# Patient Record
Sex: MALE | Race: WHITE
[De-identification: names, ages, dates, MRNs, and addresses within clinical notes are randomized per-mention and may not be internally consistent; named-entity substitution may affect disease eponyms.]

---

## 2019-09-05 ENCOUNTER — LABORATORY RESULT (OUTPATIENT)
Age: 24
End: 2019-09-05

## 2019-09-05 ENCOUNTER — APPOINTMENT (OUTPATIENT)
Dept: GASTROENTEROLOGY | Facility: CLINIC | Age: 24
End: 2019-09-05
Payer: COMMERCIAL

## 2019-09-05 VITALS
SYSTOLIC BLOOD PRESSURE: 130 MMHG | HEART RATE: 58 BPM | WEIGHT: 136 LBS | BODY MASS INDEX: 22.66 KG/M2 | DIASTOLIC BLOOD PRESSURE: 67 MMHG | HEIGHT: 65 IN

## 2019-09-05 DIAGNOSIS — R19.4 CHANGE IN BOWEL HABIT: ICD-10-CM

## 2019-09-05 DIAGNOSIS — K21.9 GASTRO-ESOPHAGEAL REFLUX DISEASE W/OUT ESOPHAGITIS: ICD-10-CM

## 2019-09-05 DIAGNOSIS — R14.0 ABDOMINAL DISTENSION (GASEOUS): ICD-10-CM

## 2019-09-05 DIAGNOSIS — R06.00 DYSPNEA, UNSPECIFIED: ICD-10-CM

## 2019-09-05 PROCEDURE — 99204 OFFICE O/P NEW MOD 45 MIN: CPT | Mod: 25

## 2019-09-05 PROCEDURE — 36415 COLL VENOUS BLD VENIPUNCTURE: CPT

## 2019-09-05 PROCEDURE — 82272 OCCULT BLD FECES 1-3 TESTS: CPT

## 2019-09-05 NOTE — CONSULT LETTER
[Dear  ___] : Dear  [unfilled], [Consult Letter:] : I had the pleasure of evaluating your patient, [unfilled]. [Consult Closing:] : Thank you very much for allowing me to participate in the care of this patient.  If you have any questions, please do not hesitate to contact me. [Please see my note below.] : Please see my note below. [Sincerely,] : Sincerely, [FreeTextEntry3] : Jason Quiñonez M.D.\par

## 2019-09-05 NOTE — HISTORY OF PRESENT ILLNESS
[FreeTextEntry1] : Over the past couple of years, Jason has noted change in bowels with tendency towards loose stools, bloating, gassiness, and excessive flatulence. He has been averaging 1 loose stool daily, although he has had some normal stools interspersed. He reports nausea, borborygmi, postprandial fullness, and frequent heartburn (3x/week). The bloating and abdominal pain are present on awakening. Epigastric discomfort generally lasts one hour before subsiding. He saw blood on wiping on one occasion two months ago. Symptoms have not awakened him from a sound sleep. Symptoms are generally better after passing gas or stool. He also notes pruritus ani. He is concerned about the possibility of parasites, as he has been eating mainly raw foods (sushi, meat), usually one large meal daily late in the day. He also reports recent onset dyspnea; he wonders if this might be related to mold exposure in his house. He has not yet tried any medications for his various GI symptoms

## 2019-09-05 NOTE — REASON FOR VISIT
[Consultation] : a consultation visit [FreeTextEntry1] : Pt would like to check for parasites.  He has been eating raw food x 2 years.  Pt c/o abdominal cramping, loose stools, gas, and rectal itching x 1 year.

## 2019-09-05 NOTE — ASSESSMENT
[FreeTextEntry1] : 1. Intermittent abdominal pain, altered bowels with frequent loose stools, gassiness, nausea, GERD, postprandial fullness, and pruritus ani--most likely diagnosis is IBS. Possible celiac disease, IBD, underlying infection (patient concerned about parasites), H. pylori, etc.\par 2. Recent mild dyspnea of unclear cause.\par 3. Status post wisdom teeth extraction.\par \par Plan:\par 1. Extensive bloodwork drawn by me this AM.\par 2. Submit stool to lab for pathogens.\par 3. Probiotic daily.\par 4. Try PPI-OTC daily on awakening x 2 weeks. "PPI Information Sheet" given and discussed.\par 5. He will call for test results next week. \par 6. Return here next month--might consider endoscopic evaluation, depending on test results and clinical course.

## 2019-09-05 NOTE — PHYSICAL EXAM
[General Appearance - Alert] : alert [General Appearance - In No Acute Distress] : in no acute distress [General Appearance - Well Developed] : well developed [General Appearance - Well Nourished] : well nourished [Outer Ear] : the ears and nose were normal in appearance [Sclera] : the sclera and conjunctiva were normal [Oropharynx] : the oropharynx was normal [Neck Appearance] : the appearance of the neck was normal [Neck Cervical Mass (___cm)] : no neck mass was observed [Jugular Venous Distention Increased] : there was no jugular-venous distention [Thyroid Diffuse Enlargement] : the thyroid was not enlarged [Thyroid Nodule] : there were no palpable thyroid nodules [Auscultation Breath Sounds / Voice Sounds] : lungs were clear to auscultation bilaterally [Heart Rate And Rhythm] : heart rate was normal and rhythm regular [Heart Sounds] : normal S1 and S2 [Heart Sounds Gallop] : no gallops [Murmurs] : no murmurs [Edema] : there was no peripheral edema [Full Pulse] : the pedal pulses are present [Heart Sounds Pericardial Friction Rub] : no pericardial rub [Bowel Sounds] : normal bowel sounds [Abdomen Tenderness] : non-tender [Abdomen Soft] : soft [Abdomen Mass (___ Cm)] : no abdominal mass palpated [Abdomen Hernia] : no hernia was discovered [No Rectal Mass] : no rectal mass [Normal Sphincter Tone] : normal sphincter tone [Prostate Enlargement] : the prostate was not enlarged [Cervical Lymph Nodes Enlarged Posterior Bilaterally] : posterior cervical [Prostate Tenderness] : the prostate was not tender [Cervical Lymph Nodes Enlarged Anterior Bilaterally] : anterior cervical [Supraclavicular Lymph Nodes Enlarged Bilaterally] : supraclavicular [Axillary Lymph Nodes Enlarged Bilaterally] : axillary [Femoral Lymph Nodes Enlarged Bilaterally] : femoral [Inguinal Lymph Nodes Enlarged Bilaterally] : inguinal [No CVA Tenderness] : no ~M costovertebral angle tenderness [Abnormal Walk] : normal gait [No Spinal Tenderness] : no spinal tenderness [Nail Clubbing] : no clubbing  or cyanosis of the fingernails [Musculoskeletal - Swelling] : no joint swelling seen [Motor Tone] : muscle strength and tone were normal [Skin Color & Pigmentation] : normal skin color and pigmentation [Skin Turgor] : normal skin turgor [] : no rash [Impaired Insight] : insight and judgment were intact [Oriented To Time, Place, And Person] : oriented to person, place, and time [Affect] : the affect was normal [Internal Hemorrhoid] : no internal hemorrhoids [External Hemorrhoid] : no external hemorrhoids [Occult Blood Positive] : stool was negative for occult blood [FreeTextEntry1] : multiple tattoos

## 2019-09-05 NOTE — REVIEW OF SYSTEMS
[Shortness Of Breath] : shortness of breath [Wheezing] : wheezing [Cough] : cough [Dizziness] : dizziness [Negative] : Heme/Lymph [As Noted in HPI] : as noted in HPI [FreeTextEntry4] : nasal congestion

## 2019-09-10 LAB
ALBUMIN SERPL ELPH-MCNC: 4.6 G/DL
ALP BLD-CCNC: 53 U/L
ALT SERPL-CCNC: 21 U/L
ANION GAP SERPL CALC-SCNC: 12 MMOL/L
AST SERPL-CCNC: 25 U/L
BASOPHILS # BLD AUTO: 0.07 K/UL
BASOPHILS NFR BLD AUTO: 1.4 %
BILIRUB DIRECT SERPL-MCNC: 0.1 MG/DL
BILIRUB SERPL-MCNC: 0.4 MG/DL
BUN SERPL-MCNC: 15 MG/DL
CALCIUM SERPL-MCNC: 9.6 MG/DL
CHLORIDE SERPL-SCNC: 104 MMOL/L
CO2 SERPL-SCNC: 26 MMOL/L
CREAT SERPL-MCNC: 0.85 MG/DL
CRP SERPL-MCNC: <0.1 MG/DL
ENDOMYSIUM IGA SER QL: NEGATIVE
ENDOMYSIUM IGA TITR SER: NORMAL
EOSINOPHIL # BLD AUTO: 0.47 K/UL
EOSINOPHIL NFR BLD AUTO: 9.6 %
ERYTHROCYTE [SEDIMENTATION RATE] IN BLOOD BY WESTERGREN METHOD: 5 MM/HR
ESTIMATED AVERAGE GLUCOSE: 103 MG/DL
FERRITIN SERPL-MCNC: 111 NG/ML
GGT SERPL-CCNC: 15 U/L
GLIADIN IGA SER QL: 8.3 UNITS
GLIADIN IGG SER QL: <5 UNITS
GLIADIN PEPTIDE IGA SER-ACNC: NEGATIVE
GLIADIN PEPTIDE IGG SER-ACNC: NEGATIVE
GLUCOSE SERPL-MCNC: 95 MG/DL
HBA1C MFR BLD HPLC: 5.2 %
HCT VFR BLD CALC: 45.9 %
HGB BLD-MCNC: 14.8 G/DL
IGA SER QL IEP: 353 MG/DL
IMM GRANULOCYTES NFR BLD AUTO: 0.2 %
IRON SATN MFR SERPL: 51 %
IRON SERPL-MCNC: 145 UG/DL
LYMPHOCYTES # BLD AUTO: 1.74 K/UL
LYMPHOCYTES NFR BLD AUTO: 35.4 %
MAGNESIUM SERPL-MCNC: 1.9 MG/DL
MAN DIFF?: NORMAL
MCHC RBC-ENTMCNC: 29.5 PG
MCHC RBC-ENTMCNC: 32.2 GM/DL
MCV RBC AUTO: 91.6 FL
MONOCYTES # BLD AUTO: 0.51 K/UL
MONOCYTES NFR BLD AUTO: 10.4 %
NEUTROPHILS # BLD AUTO: 2.12 K/UL
NEUTROPHILS NFR BLD AUTO: 43 %
PHOSPHATE SERPL-MCNC: 4.1 MG/DL
PLATELET # BLD AUTO: 317 K/UL
POTASSIUM SERPL-SCNC: 5.2 MMOL/L
PROT SERPL-MCNC: 7.1 G/DL
RBC # BLD: 5.01 M/UL
RBC # FLD: 13.1 %
SODIUM SERPL-SCNC: 142 MMOL/L
T3 SERPL-MCNC: 111 NG/DL
T3RU NFR SERPL: 1 TBI
T4 FREE SERPL-MCNC: 1.3 NG/DL
T4 SERPL-MCNC: 6.8 UG/DL
TIBC SERPL-MCNC: 283 UG/DL
TSH SERPL-ACNC: 1.55 UIU/ML
TTG IGA SER IA-ACNC: <1.2 U/ML
TTG IGA SER-ACNC: NEGATIVE
TTG IGG SER IA-ACNC: <1.2 U/ML
TTG IGG SER IA-ACNC: NEGATIVE
UIBC SERPL-MCNC: 138 UG/DL
WBC # FLD AUTO: 4.92 K/UL

## 2019-10-22 ENCOUNTER — APPOINTMENT (OUTPATIENT)
Dept: GASTROENTEROLOGY | Facility: CLINIC | Age: 24
End: 2019-10-22

## 2020-05-11 ENCOUNTER — APPOINTMENT (OUTPATIENT)
Dept: GASTROENTEROLOGY | Facility: CLINIC | Age: 25
End: 2020-05-11
Payer: COMMERCIAL

## 2020-05-11 VITALS — BODY MASS INDEX: 23.3 KG/M2 | HEIGHT: 66 IN | WEIGHT: 145 LBS

## 2020-05-11 DIAGNOSIS — R19.7 DIARRHEA, UNSPECIFIED: ICD-10-CM

## 2020-05-11 DIAGNOSIS — K58.0 IRRITABLE BOWEL SYNDROME WITH DIARRHEA: ICD-10-CM

## 2020-05-11 DIAGNOSIS — R14.0 ABDOMINAL DISTENSION (GASEOUS): ICD-10-CM

## 2020-05-11 DIAGNOSIS — D72.1 EOSINOPHILIA: ICD-10-CM

## 2020-05-11 PROCEDURE — 99214 OFFICE O/P EST MOD 30 MIN: CPT | Mod: 95

## 2020-05-11 RX ORDER — CHOLECALCIFEROL (VITAMIN D3) 125 MCG
125 MCG CAPSULE ORAL
Refills: 0 | Status: ACTIVE | COMMUNITY

## 2020-05-11 RX ORDER — RIFAXIMIN 550 MG/1
550 TABLET ORAL 3 TIMES DAILY
Qty: 42 | Refills: 2 | Status: ACTIVE | COMMUNITY
Start: 2020-05-11 | End: 1900-01-01

## 2020-05-11 RX ORDER — OMEGA-3/DHA/EPA/FISH OIL 300-1000MG
400 CAPSULE ORAL
Refills: 0 | Status: ACTIVE | COMMUNITY

## 2020-05-11 NOTE — HISTORY OF PRESENT ILLNESS
[FreeTextEntry1] : 2:10 PM 5/11/2020.  He continues to report diarrhea approximately 80% of the time, averaging 2-3 loose stools daily.  He reports some cramps and bloating.  The loose stools are more likely to occur after ingesting gluten or meat; he wonders if he might have food intolerance or "leaky gut."  He has been taking probiotic daily the last 2 weeks, without improvement.  Shortly after consultation in early September, bloodwork revealed 9.6% eosinophils, normal CBC and ESR and sprue panel, and GI PCR + for enteropathogenic E. coli, felt to be of dubious significance at that time.

## 2020-05-11 NOTE — REVIEW OF SYSTEMS
[Feeling Tired] : feeling tired [Feeling Poorly] : feeling poorly [Palpitations] : palpitations [Dry Eyes] : dryness of the eyes [Wheezing] : wheezing [Abdominal Pain] : abdominal pain [Diarrhea] : diarrhea [Dizziness] : dizziness [Negative] : Endocrine [As Noted in HPI] : as noted in HPI [FreeTextEntry4] : chronic sinus infection [de-identified] : vertigo [FreeTextEntry6] : allergy season sxs

## 2020-05-11 NOTE — PHYSICAL EXAM
[General Appearance - In No Acute Distress] : in no acute distress [General Appearance - Alert] : alert [General Appearance - Well Nourished] : well nourished [General Appearance - Well Developed] : well developed [Oriented To Time, Place, And Person] : oriented to person, place, and time [Affect] : the affect was normal [Impaired Insight] : insight and judgment were intact

## 2020-05-11 NOTE — CONSULT LETTER
[Dear  ___] : Dear  [unfilled], [Courtesy Letter:] : I had the pleasure of seeing your patient, [unfilled], in my office today. [Consult Closing:] : Thank you very much for allowing me to participate in the care of this patient.  If you have any questions, please do not hesitate to contact me. [Please see my note below.] : Please see my note below. [Sincerely,] : Sincerely, [FreeTextEntry3] : Jason Quiñonez M.D.\par

## 2020-05-11 NOTE — ASSESSMENT
[FreeTextEntry1] : 1. Intermittent abdominal pain, altered bowels with frequent loose stools, gassiness; mild increase in eosinophils, +EPEC--most likely diagnosis is IBS-D. Possible food allergies, eosinophilic gastroenteritis/colitis, celiac disease, IBD, etc.\par 2. Mild eosinophilia of unclear significance--see above.\par 3. Status post wisdom teeth extraction.\par \par Plan:\par 1.  Try Xifaxan 550 mg 3 times daily x 14 days.\par 2.  If symptoms continue to be problematic despite Xifaxan, try Imodium 1 daily, and then can increase to twice daily dosing after 1 week if needed.\par 3.  Consider colonoscopy and EGD--however, these procedures are not being performed at this time because of coronavirus concerns.\par 4.  Consider formal evaluation by Allergist, pending clinical course.\par 5.  Follow-up here next month.

## 2023-05-20 ENCOUNTER — NON-APPOINTMENT (OUTPATIENT)
Age: 28
End: 2023-05-20